# Patient Record
Sex: MALE | Race: WHITE
[De-identification: names, ages, dates, MRNs, and addresses within clinical notes are randomized per-mention and may not be internally consistent; named-entity substitution may affect disease eponyms.]

---

## 2022-03-16 ENCOUNTER — HOSPITAL ENCOUNTER (EMERGENCY)
Dept: HOSPITAL 33 - ED | Age: 67
Discharge: HOME | End: 2022-03-16
Payer: OTHER GOVERNMENT

## 2022-03-16 VITALS — HEART RATE: 67 BPM | SYSTOLIC BLOOD PRESSURE: 150 MMHG | DIASTOLIC BLOOD PRESSURE: 93 MMHG | OXYGEN SATURATION: 92 %

## 2022-03-16 DIAGNOSIS — Z72.0: ICD-10-CM

## 2022-03-16 DIAGNOSIS — I10: ICD-10-CM

## 2022-03-16 DIAGNOSIS — L03.032: Primary | ICD-10-CM

## 2022-03-16 PROCEDURE — 99283 EMERGENCY DEPT VISIT LOW MDM: CPT

## 2022-03-16 PROCEDURE — 96372 THER/PROPH/DIAG INJ SC/IM: CPT

## 2022-03-16 NOTE — ERPHSYRPT
- History of Present Illness


Time Seen by Provider: 03/16/22 16:15


Source: patient


Exam Limitations: no limitations


Patient Subjective Stated Complaint: LEFT FOOT/TOE PAIN


Triage Nursing Assessment: PT AMBULATED TO ROOM, ALERT AND ORIENTED X 3, PT HAS 

A SMALL SORE WHICH HE SCRATCHED A COUPLE WEEKS AGO AND NOW IT IS CAUSING PAIN, 

SOME REDNESS NOTED TO FOOT, RED AND HOT TO THE TOUCH.


Physician History: 


Patient is a 66-year-old male [place]  [facility] 

presents to our ED for evaluation of a foot infection.  Patient states his left 

third digit had a sore on the dorsal aspect which he picked.  Patient states 

over the course of 2 weeks he has noticed swelling and redness and now he 

production at this area.  Minimal pain.  No fever.  The area of redness is just 

around the base of the toe.  There is no trauma.  Patient has no bony 

tenderness.  Patient is ambulatory with a normal gait.  Patient voices no other 

complaints or concerns at this time.





Timing/Duration: week(s) (2 weeks ago)


Severity: moderate


Modifying Factors: Improves With: nothing


Associated Symptoms: denies symptoms


Allergies/Adverse Reactions: 








No Known Drug Allergies Allergy (Unverified 03/16/22 16:13)


   





Home Medications: 








Gabapentin 100 mg*** [Neurontin 100 MG***] 100 mg PO HS 03/16/22 [History]





Hx Tetanus, Diphtheria Vaccination/Date Given: Yes


Hx Influenza Vaccination/Date Given: Yes


Hx Pneumococcal Vaccination/Date Given: Yes


Immunizations Up to Date: Yes





Travel Risk





- International Travel


Have you traveled outside of the country in past 3 weeks: No





- Coronavirus Screening


Are you exhibiting any of the following symptoms?: No


Close contact with a COVID-19 positive Pt in past 14-21 Days: No





- Vaccine Status


Have you recieved a Covid-19 vaccination: Yes


: Unknown





- Vaccination Dates


Dates if Unknown: UNKNOWN





- Review of Systems


Constitutional: No Symptoms, No Fever, No Chills


Eyes: No Symptoms


Ears, Nose, & Throat: No Symptoms


Respiratory: No Symptoms, No Cough, No Dyspnea


Cardiac: No Symptoms, No Chest Pain, No Edema, No Syncope


Abdominal/Gastrointestinal: No Symptoms, No Abdominal Pain, No Nausea, No 

Vomiting, No Diarrhea


Genitourinary Symptoms: No Symptoms, No Dysuria


Musculoskeletal: No Symptoms, No Back Pain, No Neck Pain


Skin: No Symptoms, No Rash


Neurological: No Symptoms, No Dizziness, No Focal Weakness, No Sensory Changes


Psychological: No Symptoms


Endocrine: No Symptoms


Hematologic/Lymphatic: No Symptoms


Immunological/Allergic: No Symptoms


All Other Systems: Reviewed and Negative





- Past Medical History


Neurological History: Seizures


ENT History: No Pertinent History


Cardiac History: Hypertension


Respiratory History: No Pertinent History


Endocrine Medical History: No Pertinent History


Musculoskeletal History: Fractures


GI Medical History: Cirrhosis


 History: No Pertinent History


Psycho-Social History: Depression


Male Reproductive Disorders: No Pertinent History





- Past Surgical History


Past Surgical History: No





- Social History


Smoking Status: Current every day smoker


How long have you smoked: 50 YEARS


Exposure to second hand smoke: No


Drug Use: none





- Nursing Vital Signs


Nursing Vital Signs: 


                               Initial Vital Signs











Temperature  98.9 F   03/16/22 16:03


 


Pulse Rate  94 H  03/16/22 16:03


 


Respiratory Rate  20   03/16/22 16:03


 


Blood Pressure  165/106   03/16/22 16:03


 


O2 Sat by Pulse Oximetry  95   03/16/22 16:03








                                   Pain Scale











Pain Intensity                 6

















- Physical Exam


General Appearance: no apparent distress, alert


Eye Exam: PERRL/EOMI, eyes nml inspection


Ears, Nose, Throat Exam: normal ENT inspection, TMs normal, pharynx normal, 

moist mucous membranes


Neck Exam: normal inspection, non-tender, supple, full range of motion


Respiratory Exam: normal breath sounds, lungs clear, airway intact, No 

respiratory distress


Cardiovascular Exam: regular rate/rhythm, normal heart sounds, normal peripheral

 pulses


Gastrointestinal/Abdomen Exam: soft, normal bowel sounds, No tenderness, No mass


Back Exam: normal inspection, normal range of motion, No CVA tenderness, No 

vertebral tenderness


Extremity Exam: normal inspection, normal range of motion, pelvis stable, 

tenderness (Slight tenderness to the area of involvement.  No crepitation or 

obvious swelling.), other (Third digit left foot has a area of cellulitis 

measuring 1 cm.  No lymphangitis.  No lymphadenopathy.  No fever.  No open or 

draining lesions extremities neurovascular intact distally.  Compartments are 

soft.  Cap refill less than 2 seconds.)


Neurologic Exam: alert, oriented x 3, cooperative, normal mood/affect, nml 

cerebellar function, nml station & gait, sensation nml, No motor deficits


Skin Exam: normal color, warm, dry, No rash


Lymphatic Exam: No adenopathy


**SpO2 Interpretation**: normal


SpO2: 93


O2 Delivery: Room Air





- Course


Nursing assessment & vital signs reviewed: Yes


Ordered Tests: 


Medication Summary














Discontinued Medications














Generic Name Dose Route Start Last Admin





  Trade Name Robin  PRN Reason Stop Dose Admin


 


Ceftriaxone Sodium  1,000 mg  03/16/22 16:38 





  Ceftriaxone Sodium 500 Mg Vial  IM  03/16/22 16:39 





  STAT ONE  














- Progress


Progress: improved


Progress Note: 


66-year-old male nondiabetic with a new onset cellulitis at the dorsal aspect of

 the base of the left third digit.  The cellulitis area was demarcated using a 

surgical marker.  Patient received a IM dose of Rocephin in our ED.  A 

prescription for Bactrim was forwarded to patient's pharmacy.  Patient will pick

 up his prescription today.  Patient agrees to follow-up with his primary care 

doctor within 48 hours for evaluation.  Patient voiced no other complaints 

concerns at this time.





Portions of this note were created with voice recognition technology.  There may

 be grammatical, spelling, punctuation or sound alike errors


03/16/22 16:46





Counseled pt/family regarding: diagnosis, need for follow-up





- Departure


Departure Disposition: Home


Clinical Impression: 


 Cellulitis of foot





Condition: Stable


Critical Care Time: No


Additional Instructions: 


Discharge/Care Plan





DEBRA DAWKINS was seen on 03/16/22 in the Emergency Room. The patient was 

counseled regarding Diagnosis,Lab results, Imaging studies, need for follow up 

and when to return to the Emergency Room.





Prescriptions given:





Discharge Note





I have spoken with the patient and/or caregivers. I have explained the patient's

 condition, diagnosis and treatment plan based on the information available to 

me at this time. I have answered the patient's and/or caregiver's questions and 

addressed any concerns. The patient and/or caregivers have as good understanding

 of the patient's diagnosis, condition and treatment plan as can be expected at 

this point. The vital signs have been stable. The patient's condition is stable 

and appropriate for discharge from the emergency department.





The patient will pursue further outpatient evaluation with the primary care 

physician or other designated or consulting physician as outlined in the 

discharge instructions. The patient and/or caregivers are agreeable to this plan

 of care and follow-up instructions have been explained in detail. The patient 

and/or caregivers have received these instruction. The patient/and or caregivers

 are aware that any significant change in condition or worsening of symptoms 

should prompt an immediate return to this or the closest emergency department or

 call 911. 








Prescriptions: 


Smz/Tmp Ds Tablet*** [Bactrim Ds Tablet***] 1 udtab PO BID #14 tablet

## 2023-07-24 ENCOUNTER — HOSPITAL ENCOUNTER (OUTPATIENT)
Dept: HOSPITAL 33 - SDC | Age: 68
Discharge: HOME | End: 2023-07-24
Attending: SURGERY
Payer: OTHER GOVERNMENT

## 2023-07-24 VITALS
OXYGEN SATURATION: 96 % | TEMPERATURE: 97.4 F | DIASTOLIC BLOOD PRESSURE: 83 MMHG | HEART RATE: 97 BPM | SYSTOLIC BLOOD PRESSURE: 125 MMHG

## 2023-07-24 VITALS — RESPIRATION RATE: 16 BRPM

## 2023-07-24 DIAGNOSIS — Z12.11: Primary | ICD-10-CM

## 2023-07-24 DIAGNOSIS — K29.70: ICD-10-CM

## 2023-07-24 DIAGNOSIS — R13.10: ICD-10-CM

## 2023-07-24 DIAGNOSIS — K63.5: ICD-10-CM

## 2023-07-24 DIAGNOSIS — K22.2: ICD-10-CM

## 2023-07-24 DIAGNOSIS — K29.80: ICD-10-CM

## 2023-07-24 LAB
ALBUMIN SERPL-MCNC: 4.2 G/DL (ref 3.5–5)
ALP SERPL-CCNC: 77 U/L (ref 38–126)
ALT SERPL-CCNC: 24 U/L (ref 0–50)
ANION GAP SERPL CALC-SCNC: 17 MEQ/L (ref 5–15)
AST SERPL QL: 38 U/L (ref 17–59)
BILIRUB BLD-MCNC: 1.8 MG/DL (ref 0.2–1.3)
BUN SERPL-MCNC: 21 MG/DL (ref 9–20)
CALCIUM SPEC-MCNC: 9.3 MG/DL (ref 8.4–10.2)
CHLORIDE SERPL-SCNC: 103 MMOL/L (ref 98–107)
CO2 SERPL-SCNC: 21 MMOL/L (ref 22–30)
CREAT SERPL-MCNC: 0.95 MG/DL (ref 0.66–1.25)
GFR SERPLBLD BASED ON 1.73 SQ M-ARVRAT: > 60 ML/MIN
GLUCOSE SERPL-MCNC: 80 MG/DL (ref 74–106)
HCT VFR BLD AUTO: 52.2 % (ref 42–50)
HGB BLD-MCNC: 16.8 G/DL (ref 12.5–18)
MCH RBC QN AUTO: 29.4 PG (ref 26–32)
MCHC RBC AUTO-ENTMCNC: 32.2 G/DL (ref 32–36)
PLATELET # BLD AUTO: 115 X10^3/UL (ref 150–450)
POTASSIUM SERPLBLD-SCNC: 4.4 MMOL/L (ref 3.5–5.1)
PROT SERPL-MCNC: 8.1 G/DL (ref 6.3–8.2)
RBC # BLD AUTO: 5.72 X10^6/UL (ref 4.1–5.6)
SODIUM SERPL-SCNC: 137 MMOL/L (ref 137–145)
WBC # BLD AUTO: 6.2 X10^3/UL (ref 4–10.5)

## 2023-07-24 PROCEDURE — 85027 COMPLETE CBC AUTOMATED: CPT

## 2023-07-24 PROCEDURE — 36415 COLL VENOUS BLD VENIPUNCTURE: CPT

## 2023-07-24 PROCEDURE — 93005 ELECTROCARDIOGRAM TRACING: CPT

## 2023-07-24 PROCEDURE — 80053 COMPREHEN METABOLIC PANEL: CPT

## 2023-07-24 NOTE — HP
DATE OF SURGERY:  07/24/2023 



HISTORY OF PRESENT ILLNESS:  The patient is a 67-year-old history of cirrhosis followed at 
. He had prior dilatation and now more dysphasia mid to lower esophagus. He is in need 
of EGD possible dilatation. 



PAST MEDICAL HISTORY:  Asthma, cirrhosis, depression, hyperlipidemia, hypertension, 
seizure disorder. 



PAST SURGICAL HISTORY:  He denied prior surgery. He had endoscopy and dilatation in the 
past. 



MEDICATIONS:  Atorvastatin, gabapentin. 



ALLERGIES:  NKDA.

  

FAMILY HISTORY:  Negative in regards to this problem.  



SOCIAL HISTORY:  Smoker. No current alcohol abuse. 



REVIEW OF SYSTEMS:  Fourteen systems reviewed. No chest pain or palpitations. Other 
systems negative or noncontributory as above and per preadmission questionnaire. 



PHYSICAL EXAMINATION:  Height 5'3".  BMI 21. 

GENERAL:  No acute distress.

HEENT: Sclerae trace icterus. EOMI. Oral mucous membranes moist. 

NECK:  No JVD.

CHEST: Equal excursion, nonlabored breathing. 

CVS:  Regular rate and rhythm. 

ABDOMEN:  Soft.  

EXTREMITIES:  No significant edema.

NEURO:  Alert, oriented, moving extremities symmetrically.    

PSYCH: Appropriate mood and affect.  

SKIN:  Dry.



IMPRESSION:  Dysphagia. He is in need of EGD possible dilatation. Risk of bleeding or 
infection, perforation, possible open procedure, possible need for transfer for stent 
placement, risk of bleeding or other procedure requiring transfer, possible no improvement 
in swallowing, risk of mortality but not limited to. Will proceed with EGD possible biopsy 
possible dilatation as an outpatient. Continue medication for cirrhosis, hypertension, 
lipids, multiple medical problems as mentioned above. Will proceed with EGD possible 
biopsy possible dilatation as an outpatient.

## 2023-07-25 NOTE — OP
SURGERY DATE/TIME:  07/24/2023  1111



PREOPERATIVE DIAGNOSES:  

1) Need for screening colonoscopy.

2) History of dysphagia, need for upper endoscopy. 



POSTOPERATIVE DIAGNOSES: 

1) Erosive duodenitis.

2) Erosive gastritis. 

3) Distal esophageal narrowing and spasm without evidence of any mass.

4) Colon polyps. 

5) Fair bowel prep. 

6) ASA Class II. 

7) Withdrawal of colonoscopy approximately 12 minutes. 



PROCEDURES:     

1) EGD with cold biopsy of antrum for Helicobacter pylori.  

2) Distal esophageal balloon dilatation (size 20 balloon). 

3) Colonoscopy to cecum with hot biopsy of hyperplastic lesion x2. 

4) Hot biopsy polypectomy early polyp versus hyperplastic lesion rectosigmoid x1.



SURGEON:        Dr. Higiino Jimenes.



ANESTHESIA:    MAC.



ESTIMATED BLOOD LOSS:    Minimal.    



INDICATIONS:  As noted above. Risks and benefits explained in detail and not limited to 
and consent obtained.     

     

DESCRIPTION OF PROCEDURE AND FINDINGS:  The patient is taken to the operating room. MAC 
anesthesia introduced. After official time out and no disagreement with planned procedure, 
bite block positioned. Video gastroscope easily passed down the proximal esophagus. In the 
distal esophagus, there is no evidence of mass but there was some tertiary contractions of 
esophagus as well as some esophageal spasm and narrowing where he was having his symptoms 
here. It was felt this warranted a trial of dilatation. The scope was passed back down 
through to the third portion of the duodenum. In the proximal duodenum in the second and 
third portion, he had some superficial erosions but he did have anything deep enough to 
call an ulcer at this point but he did have some erosive duodenitis. Back in the stomach, 
he had some erosive gastritis as well as petechial hemorrhages. No evidence of any large 
ulcer. Cold biopsy taken for Helicobacter pylori. On retroflex, he had slight weakness at 
the hiatus but no evidence of any large hiatal hernia. The scope is pulled back. Z-line is 
fairly crisp. No signs of any masses or erosions. Again, he did have a distal narrowing 
that warranted a trial of dilatation. Therefore the scope passed back down the stomach. A 
20 balloon catheter carefully inserted to oropharynx to the stomach, pulled back up to the 
distal esophagus narrowed area and carefully inflated first stage for about 30 seconds, 
second stage 30 seconds, final stage approximately 2 minutes size 20 balloon dilator. The 
balloon was then released and withdrawn. The area was re-inspected and it was more widely 
patent. There were no signs of any full thickness issues or injury secondary to 
dilatation. The scope is withdrawn.  

  

Attention is then turned to colonoscopy. Digital rectal exam did not reveal any rectal 
masses.   Video colonoscope inserted and passed up the slightly tortuous sigmoid, 
descending, transverse, ascending colon. With external pressure the scope passed down to 
the cecum. Prep overall was fair with a little bit of liquidy, semisolid stool suctioned 
and irrigated as clear as possible. Appendiceal orifice and valve well visualized and 
photo documented. The scope is then carefully withdrawn over the next 12 minutes suction 
irrigating liquid stool as well as possible. No signs of any large polyps, masses or 
obstructing lesions. There were two small early polyps versus hyperplastic lesion in the 
sigmoid colon removed with hot biopsy forceps hot biopsy polypectomy. Good hemostasis 
noted. One in the rectosigmoid colon removed with hot biopsy polypectomy. Otherwise no 
signs of any large polyps, masses or obstructing lesions. The scope is withdrawn. The 
patient tolerated the procedure well. Findings discussed with the family out in the 
waiting area. He will do room temperature liquids for four hours and advance diet as 
tolerated. Avoid aspirin, NSAID or thinners. I will see him back in the office in a week.